# Patient Record
Sex: MALE | Race: WHITE | Employment: FULL TIME | URBAN - METROPOLITAN AREA
[De-identification: names, ages, dates, MRNs, and addresses within clinical notes are randomized per-mention and may not be internally consistent; named-entity substitution may affect disease eponyms.]

---

## 2023-10-04 PROBLEM — M54.12 RIGHT CERVICAL RADICULOPATHY: Status: ACTIVE | Noted: 2023-10-04

## 2023-10-04 PROBLEM — M99.09 SEGMENTAL AND SOMATIC DYSFUNCTION: Status: ACTIVE | Noted: 2023-10-04

## 2023-10-05 ENCOUNTER — LAB (OUTPATIENT)
Dept: LAB | Facility: LAB | Age: 43
End: 2023-10-05
Payer: COMMERCIAL

## 2023-10-05 ENCOUNTER — OFFICE VISIT (OUTPATIENT)
Dept: PRIMARY CARE | Facility: CLINIC | Age: 43
End: 2023-10-05
Payer: COMMERCIAL

## 2023-10-05 ENCOUNTER — ALLIED HEALTH (OUTPATIENT)
Dept: INTEGRATIVE MEDICINE | Facility: CLINIC | Age: 43
End: 2023-10-05
Payer: COMMERCIAL

## 2023-10-05 VITALS
RESPIRATION RATE: 16 BRPM | DIASTOLIC BLOOD PRESSURE: 80 MMHG | BODY MASS INDEX: 34.86 KG/M2 | HEIGHT: 68 IN | SYSTOLIC BLOOD PRESSURE: 122 MMHG | HEART RATE: 79 BPM | WEIGHT: 230 LBS | OXYGEN SATURATION: 96 %

## 2023-10-05 DIAGNOSIS — Z00.00 ENCOUNTER FOR ANNUAL PHYSICAL EXAM: ICD-10-CM

## 2023-10-05 DIAGNOSIS — M99.01 SOMATIC DYSFUNCTION OF CERVICAL REGION: Primary | ICD-10-CM

## 2023-10-05 DIAGNOSIS — M79.9 POSTURAL STRAIN: ICD-10-CM

## 2023-10-05 DIAGNOSIS — M25.511 PAIN IN SHOULDER REGION, RIGHT: ICD-10-CM

## 2023-10-05 DIAGNOSIS — M54.12 RIGHT CERVICAL RADICULOPATHY: ICD-10-CM

## 2023-10-05 DIAGNOSIS — Z00.00 ANNUAL PHYSICAL EXAM: ICD-10-CM

## 2023-10-05 DIAGNOSIS — Z00.00 ANNUAL PHYSICAL EXAM: Primary | ICD-10-CM

## 2023-10-05 DIAGNOSIS — M99.02 SOMATIC DYSFUNCTION OF THORACIC REGION: ICD-10-CM

## 2023-10-05 LAB
ALBUMIN SERPL BCP-MCNC: 4.4 G/DL (ref 3.4–5)
ALP SERPL-CCNC: 77 U/L (ref 33–120)
ALT SERPL W P-5'-P-CCNC: 26 U/L (ref 10–52)
ANION GAP SERPL CALC-SCNC: 13 MMOL/L (ref 10–20)
AST SERPL W P-5'-P-CCNC: 17 U/L (ref 9–39)
BILIRUB SERPL-MCNC: 0.4 MG/DL (ref 0–1.2)
BUN SERPL-MCNC: 9 MG/DL (ref 6–23)
CALCIUM SERPL-MCNC: 9 MG/DL (ref 8.6–10.3)
CHLORIDE SERPL-SCNC: 103 MMOL/L (ref 98–107)
CHOLEST SERPL-MCNC: 190 MG/DL (ref 0–199)
CHOLESTEROL/HDL RATIO: 3.8
CO2 SERPL-SCNC: 26 MMOL/L (ref 21–32)
CREAT SERPL-MCNC: 1.06 MG/DL (ref 0.5–1.3)
ERYTHROCYTE [DISTWIDTH] IN BLOOD BY AUTOMATED COUNT: 11.9 % (ref 11.5–14.5)
GFR SERPL CREATININE-BSD FRML MDRD: 88 ML/MIN/1.73M*2
GLUCOSE SERPL-MCNC: 78 MG/DL (ref 74–99)
HCT VFR BLD AUTO: 45.2 % (ref 41–52)
HDLC SERPL-MCNC: 50.5 MG/DL
HGB BLD-MCNC: 15.4 G/DL (ref 13.5–17.5)
LDLC SERPL CALC-MCNC: 117 MG/DL (ref 140–190)
MCH RBC QN AUTO: 32.2 PG (ref 26–34)
MCHC RBC AUTO-ENTMCNC: 34.1 G/DL (ref 32–36)
MCV RBC AUTO: 94 FL (ref 80–100)
NON HDL CHOLESTEROL: 140 MG/DL (ref 0–149)
NRBC BLD-RTO: 0 /100 WBCS (ref 0–0)
PLATELET # BLD AUTO: 322 X10*3/UL (ref 150–450)
PMV BLD AUTO: 9.9 FL (ref 7.5–11.5)
POTASSIUM SERPL-SCNC: 4.2 MMOL/L (ref 3.5–5.3)
PROT SERPL-MCNC: 7.1 G/DL (ref 6.4–8.2)
PSA SERPL-MCNC: 0.76 NG/ML
RBC # BLD AUTO: 4.79 X10*6/UL (ref 4.5–5.9)
SODIUM SERPL-SCNC: 138 MMOL/L (ref 136–145)
TRIGL SERPL-MCNC: 115 MG/DL (ref 0–149)
TSH SERPL-ACNC: 3.07 MIU/L (ref 0.44–3.98)
VLDL: 23 MG/DL (ref 0–40)
WBC # BLD AUTO: 9.1 X10*3/UL (ref 4.4–11.3)

## 2023-10-05 PROCEDURE — 85027 COMPLETE CBC AUTOMATED: CPT

## 2023-10-05 PROCEDURE — 1036F TOBACCO NON-USER: CPT | Performed by: INTERNAL MEDICINE

## 2023-10-05 PROCEDURE — 97112 NEUROMUSCULAR REEDUCATION: CPT | Performed by: CHIROPRACTOR

## 2023-10-05 PROCEDURE — 36415 COLL VENOUS BLD VENIPUNCTURE: CPT

## 2023-10-05 PROCEDURE — 84153 ASSAY OF PSA TOTAL: CPT

## 2023-10-05 PROCEDURE — 82652 VIT D 1 25-DIHYDROXY: CPT

## 2023-10-05 PROCEDURE — 83036 HEMOGLOBIN GLYCOSYLATED A1C: CPT

## 2023-10-05 PROCEDURE — 98940 CHIROPRACT MANJ 1-2 REGIONS: CPT | Performed by: CHIROPRACTOR

## 2023-10-05 PROCEDURE — 84443 ASSAY THYROID STIM HORMONE: CPT

## 2023-10-05 PROCEDURE — 80061 LIPID PANEL: CPT

## 2023-10-05 PROCEDURE — 99396 PREV VISIT EST AGE 40-64: CPT | Performed by: INTERNAL MEDICINE

## 2023-10-05 PROCEDURE — 80053 COMPREHEN METABOLIC PANEL: CPT

## 2023-10-05 ASSESSMENT — PAIN SCALES - GENERAL: PAINLEVEL: 0-NO PAIN

## 2023-10-05 NOTE — PROGRESS NOTES
"Venancio Mcintyre is a 43 y.o. male who presents for New Patient Visit.  Patient presents to [facility] care and for yearly physical.  Patient is not on any medications.  He has no significant past medical history.  No significant family history.  Review of systems negative.  No issues with bowels or bladder.  No issues with sleep.        Objective     /80 (BP Location: Right arm, Patient Position: Sitting, BP Cuff Size: Adult)   Pulse 79   Resp 16   Ht 1.727 m (5' 8\")   Wt 104 kg (230 lb)   SpO2 96%   BMI 34.97 kg/m²      Physical Exam  Constitutional:       Appearance: Normal appearance.   HENT:      Head: Normocephalic and atraumatic.      Nose: Nose normal.      Mouth/Throat:      Mouth: Mucous membranes are moist.      Pharynx: Oropharynx is clear.   Eyes:      Extraocular Movements: Extraocular movements intact.      Pupils: Pupils are equal, round, and reactive to light.   Cardiovascular:      Rate and Rhythm: Normal rate and regular rhythm.   Pulmonary:      Effort: No respiratory distress.      Breath sounds: Normal breath sounds. No wheezing, rhonchi or rales.   Abdominal:      General: Bowel sounds are normal. There is no distension.      Palpations: Abdomen is soft.      Tenderness: There is no abdominal tenderness. There is no guarding.   Musculoskeletal:      Right lower leg: No edema.      Left lower leg: No edema.   Skin:     General: Skin is warm and dry.   Neurological:      Mental Status: He is alert and oriented to person, place, and time. Mental status is at baseline.   Psychiatric:         Mood and Affect: Mood normal.         Behavior: Behavior normal.         Assessment/Plan   Problem List Items Addressed This Visit       Annual physical exam - Primary    Relevant Orders    CBC    Comprehensive Metabolic Panel    Hemoglobin A1C    Lipid Panel    Prostate Specific Antigen    Thyroid Stimulating Hormone    Vitamin D 1,25 Dihydroxy (for eval of hypercalcemia)     Patient is " not taking any medications  No family history of colon cancer, will get colonoscopy at age 48  Patient is a non-smoker  Patient to obtain blood work today  Return in 1 year for repeat physical       Shamir Rodgers,

## 2023-10-06 DIAGNOSIS — Z00.00 ANNUAL PHYSICAL EXAM: ICD-10-CM

## 2023-10-06 PROBLEM — M79.9 POSTURAL STRAIN: Status: ACTIVE | Noted: 2023-10-06

## 2023-10-06 PROBLEM — M99.02 SOMATIC DYSFUNCTION OF THORACIC REGION: Status: ACTIVE | Noted: 2023-10-06

## 2023-10-06 PROBLEM — M25.511 PAIN IN SHOULDER REGION, RIGHT: Status: ACTIVE | Noted: 2023-10-06

## 2023-10-06 PROBLEM — M99.01 SOMATIC DYSFUNCTION OF CERVICAL REGION: Status: ACTIVE | Noted: 2023-10-06

## 2023-10-06 LAB
EST. AVERAGE GLUCOSE BLD GHB EST-MCNC: 117 MG/DL
HBA1C MFR BLD: 5.7 %

## 2023-10-06 ASSESSMENT — ENCOUNTER SYMPTOMS
ABDOMINAL PAIN: 0
JOINT SWELLING: 0
FATIGUE: 0
CONSTIPATION: 0
TROUBLE SWALLOWING: 0
DIARRHEA: 0
CONFUSION: 0
FREQUENCY: 0
FEVER: 0
CHEST TIGHTNESS: 0

## 2023-10-06 NOTE — PROGRESS NOTES
Subjective   Patient ID: Yordan Mcintyre is a 43 y.o. male who presents today for upper back pain    2 VPCY    HPI -the patient reports that he did not experience any soreness from initial visit.  He reports that he feels slightly better than initial visit.  He does continue to experience dull and stiff sensation/pain in the right cervical thoracic region and also experiences occasional numbness and tingling into the right upper extremity which travels to the index finger.    (09/12/2023: The patient presents for initial evaluation and management of subacute right-sided CT pain. Symptoms hav been present for about 3 months. There is no specific SYLVIA, but he feels like a sleeping posture of lying prone with right arm flexed and abducted as well as deskwork contribute to this. He describes his symptoms as dull and stiff and also experiences numbness and tingling in the right UE which travels to the index finger. He denies weakness. He denies HA's. He does not report any palliative factors. He has not sought out treatment for this prior to today.     He denies any significant medical history. )    Review of Systems   Constitutional:  Negative for fatigue and fever.   HENT:  Negative for trouble swallowing.    Eyes:  Negative for visual disturbance.   Respiratory:  Negative for chest tightness.    Cardiovascular:  Negative for chest pain and leg swelling.   Gastrointestinal:  Negative for abdominal pain, constipation and diarrhea.   Genitourinary:  Negative for frequency.   Musculoskeletal:  Negative for joint swelling.   Neurological:  Negative for syncope.   Psychiatric/Behavioral:  Negative for confusion.    All other systems reviewed and are negative.      Objective     The patient is alert and oriented x 3. [facility].  Cranial nerves II-XII are grossly intact. No difficulty with transitional movements is observed.  Rounded shoulders, worse on right.  Increased thoracic kyphosis.  No deficits observed when analyzing gait.   No scarring is present.    Moderate hypertonicity and tenderness is present in the cervical paraspinals, scalenes, SCM, upper trapezius, levator scapulae, rhomboids, right posterior shoulder girdle.  Findings are more prominent on the right.    Segmental joint dysfunction was assessed with motion palpation and is identified in the following areas:  Cervical: C5/6  Thoracic: T3/4, T4/5      Assessment/Plan   Differential Dx includes T4 syndrome when considering right UE complaints are reproduced when palpating that level.    (09/12/2023: See included diagnoses. Postural strain and muscular imbalances are contibuting to the patient's pain syndrome. No contraindications to care are present. We will implement a brief trial of care and emphasize HEP to address the patient's condition. We will monitor his response to care to determine if any changes need to occur or if imaging is needed. )    Today's treatment:    Diversified manipulation to the involved thoracic segments.    Neuromuscular re-education to right upper trapezius and levator scapulae in form of IASTM in neutral and stretched position.  ART applied to R pec minor.  Mobilization and manual traction applied to cervical spine.   Start time for neuromuscular re-education/manual therapy was 3:25 pm and ending time was 3:40 pm.     Treatment Plan:   The patient tolerated today's treatment with little or no additional discomfort and was instructed to contact the office for questions or concerns.   Weekly, every other week for 4 visits.   We will closely monitor their response to care to determine if any changes need to occur, if advanced imaging is needed, or if referral to other providers is appropriate.     Please note: Voice to text software was used when completing this note and portions may include grammatical errors. Please contact me with any questions/concerns as it relates to these types of errors.

## 2023-10-07 LAB — 1,25(OH)2D3 SERPL-MCNC: 67 PG/ML (ref 19.9–79.3)

## 2023-10-24 ENCOUNTER — APPOINTMENT (OUTPATIENT)
Dept: INTEGRATIVE MEDICINE | Facility: CLINIC | Age: 43
End: 2023-10-24
Payer: COMMERCIAL